# Patient Record
Sex: FEMALE | Race: WHITE | NOT HISPANIC OR LATINO | Employment: OTHER | URBAN - METROPOLITAN AREA
[De-identification: names, ages, dates, MRNs, and addresses within clinical notes are randomized per-mention and may not be internally consistent; named-entity substitution may affect disease eponyms.]

---

## 2017-01-12 RX ORDER — GABAPENTIN 600 MG/1
TABLET ORAL
Qty: 90 TABLET | Refills: 2 | Status: SHIPPED | OUTPATIENT
Start: 2017-01-12 | End: 2017-03-29 | Stop reason: SDUPTHER

## 2017-03-31 RX ORDER — GABAPENTIN 600 MG/1
TABLET ORAL
Qty: 90 TABLET | Refills: 2 | Status: SHIPPED | OUTPATIENT
Start: 2017-03-31 | End: 2017-06-12 | Stop reason: SDUPTHER

## 2017-06-13 RX ORDER — GABAPENTIN 600 MG/1
TABLET ORAL
Qty: 90 TABLET | Refills: 2 | Status: SHIPPED | OUTPATIENT
Start: 2017-06-13 | End: 2017-09-13 | Stop reason: SDUPTHER

## 2017-08-18 DIAGNOSIS — Z12.11 COLON CANCER SCREENING: ICD-10-CM

## 2017-09-14 RX ORDER — GABAPENTIN 600 MG/1
TABLET ORAL
Qty: 90 TABLET | Refills: 2 | Status: SHIPPED | OUTPATIENT
Start: 2017-09-14 | End: 2018-01-12 | Stop reason: SDUPTHER

## 2018-01-14 RX ORDER — GABAPENTIN 600 MG/1
TABLET ORAL
Qty: 90 TABLET | Refills: 2 | Status: SHIPPED | OUTPATIENT
Start: 2018-01-14 | End: 2018-04-07 | Stop reason: SDUPTHER

## 2018-01-17 ENCOUNTER — PATIENT OUTREACH (OUTPATIENT)
Dept: ADMINISTRATIVE | Facility: HOSPITAL | Age: 53
End: 2018-01-17

## 2018-01-17 NOTE — PROGRESS NOTES
Health Maintenance Due   Topic Date Due    TETANUS VACCINE  07/10/1985    Pneumococcal PPSV23 (Medium Risk) (1) 07/10/1985    Mammogram  07/10/2007    Lipid Panel  07/16/2012    Colonoscopy  07/10/2017    Influenza Vaccine  08/01/2017     Due for an annual .  Outreach mailed

## 2018-01-17 NOTE — LETTER
January 17, 2018    Peggy Oliveira  [address] [facility]  [place] [address]             [facility]  [address]  [place] [address]  Phone: [phone] Dear Ms. Oliveira:    [facility] is committed to your overall health.  Our records indicate that you are due for an annual checkup with your primary care provider,  Dr. Johnson.  Please call [phone] to schedule a routine physical exam. You may also be due for the following test and/or procedures:    Tetanus immunization  Pneumonia immunization  Mammogram  Cholesterol check (Lipid Panel)  Colonoscopy (Colorectal screening)  Influenza vaccine    If you have had any of the above done at another facility, please let us know by calling [phone] so that we can update your record.  We will add these results to your chart if you fax them to the fax number listed below.  If you have any questions, please call [phone].       Sincerely,    Marcela Nichols  Clinical Care Coordinator  [facility] [address] [facility] [address]  [place] [address]  Phone: [phone]   Fax: [phone]

## 2018-03-21 PROBLEM — R10.11 RUQ PAIN: Status: ACTIVE | Noted: 2018-03-21

## 2018-03-21 PROBLEM — R74.01 TRANSAMINITIS: Status: ACTIVE | Noted: 2018-03-21

## 2018-03-21 PROBLEM — K83.8 DILATION OF BILIARY TRACT: Status: ACTIVE | Noted: 2018-03-21

## 2018-04-09 RX ORDER — GABAPENTIN 600 MG/1
TABLET ORAL
Qty: 270 TABLET | Refills: 0 | Status: SHIPPED | OUTPATIENT
Start: 2018-04-09 | End: 2018-06-15 | Stop reason: SDUPTHER

## 2018-04-09 NOTE — PROGRESS NOTES
Refill Authorization Note     is requesting a refill authorization.    Brief assessment and rationale for refill: DEFER: needs annual  Amount/Quantity of medication ordered: 90d        Refills Authorized: Yes  If authorized number of refills: 0        Medication-related problems identified: Requires appointment  Medication Therapy Plan: hasn't f/u in some time; defer 3 mo to get pt back in  Name and strength of medication: GABAPENTIN 600 MG TABLET  How patient will take medication: t1t po TID   Medication reconciliation completed: No  Comments:   Lab Results   Component Value Date    CREATININE 1.06 03/21/2018    BUN 28 (H) 03/21/2018     03/21/2018    K 3.8 03/21/2018     03/21/2018    CO2 26 03/21/2018

## 2018-04-17 ENCOUNTER — TELEPHONE (OUTPATIENT)
Dept: ADMINISTRATIVE | Facility: HOSPITAL | Age: 53
End: 2018-04-17

## 2018-04-17 NOTE — TELEPHONE ENCOUNTER
QBP nurse notified via email that patient had an ER visit on 3/21/18 at [facility] for Right upper quadrant pain

## 2018-06-15 ENCOUNTER — OFFICE VISIT (OUTPATIENT)
Dept: FAMILY MEDICINE | Facility: CLINIC | Age: 53
End: 2018-06-15
Payer: COMMERCIAL

## 2018-06-15 VITALS
DIASTOLIC BLOOD PRESSURE: 54 MMHG | SYSTOLIC BLOOD PRESSURE: 90 MMHG | BODY MASS INDEX: 21.17 KG/M2 | HEART RATE: 90 BPM | TEMPERATURE: 98 F | HEIGHT: 63 IN | RESPIRATION RATE: 14 BRPM | OXYGEN SATURATION: 97 % | WEIGHT: 119.5 LBS

## 2018-06-15 DIAGNOSIS — L02.91 ABSCESS: Primary | ICD-10-CM

## 2018-06-15 DIAGNOSIS — G47.00 INSOMNIA, UNSPECIFIED TYPE: ICD-10-CM

## 2018-06-15 DIAGNOSIS — M51.36 DDD (DEGENERATIVE DISC DISEASE), LUMBAR: ICD-10-CM

## 2018-06-15 DIAGNOSIS — M48.061 SPINAL STENOSIS OF LUMBAR REGION, UNSPECIFIED WHETHER NEUROGENIC CLAUDICATION PRESENT: ICD-10-CM

## 2018-06-15 PROCEDURE — 99999 PR PBB SHADOW E&M-EST. PATIENT-LVL IV: CPT | Mod: PBBFAC,,, | Performed by: NURSE PRACTITIONER

## 2018-06-15 PROCEDURE — 99214 OFFICE O/P EST MOD 30 MIN: CPT | Mod: S$GLB,,, | Performed by: NURSE PRACTITIONER

## 2018-06-15 PROCEDURE — 3008F BODY MASS INDEX DOCD: CPT | Mod: CPTII,S$GLB,, | Performed by: NURSE PRACTITIONER

## 2018-06-15 RX ORDER — GABAPENTIN 600 MG/1
TABLET ORAL
Qty: 270 TABLET | Refills: 1 | Status: SHIPPED | OUTPATIENT
Start: 2018-06-15 | End: 2019-01-14 | Stop reason: SDUPTHER

## 2018-06-15 RX ORDER — TRAZODONE HYDROCHLORIDE 50 MG/1
50 TABLET ORAL NIGHTLY PRN
Qty: 30 TABLET | Refills: 1 | Status: SHIPPED | OUTPATIENT
Start: 2018-06-15 | End: 2018-07-01

## 2018-06-15 RX ORDER — IBUPROFEN 800 MG/1
800 TABLET ORAL 2 TIMES DAILY PRN
Qty: 30 TABLET | Refills: 0 | Status: SHIPPED | OUTPATIENT
Start: 2018-06-15 | End: 2018-09-17

## 2018-06-15 RX ORDER — SULFAMETHOXAZOLE AND TRIMETHOPRIM 800; 160 MG/1; MG/1
1 TABLET ORAL 2 TIMES DAILY
Qty: 14 TABLET | Refills: 0 | Status: ON HOLD | OUTPATIENT
Start: 2018-06-15 | End: 2018-06-22
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
## 2018-06-15 NOTE — PATIENT INSTRUCTIONS
Follow up with general surgery if your symptoms worsen or fail to improve        Abscess (Antibiotic Treatment Only)  An abscess (sometimes called a boil) happens when bacteria get trapped under the skin and start to grow. Pus forms inside the abscess as the body responds to the bacteria. An abscess can happen with an insect bite, ingrown hair, blocked oil gland, pimple, cyst, or puncture wound.  In the early stages, your wound may be red and tender. For this stage, you may get antibiotics. If the abscess does not get better with antibiotics, it will need to be drained with a small cut.  Home care  These tips will help you care for your abscess at home:  · Soak the wound in hot water or apply hot packs (small towel soaked in hot water) to the area for 20 minutes at a time. Do this 3 to 4 times a day.  · Do not cut, squeeze, or pop the boil yourself.  · Apply antibiotic cream or ointment to the skin 3 to 4 times a day, unless something else was prescribed. Some ointments include an antibiotic plus a pain reliever.  · If your doctor prescribed antibiotics, do not stop taking them until you have finished the medicine or the doctor tells you to stop.  · You may use an over-the-counter pain medicine to control pain, unless another pain medicine was prescribed. If you have chronic liver or kidney disease or ever had a stomach ulcer or gastrointestinal bleeding, talk with your doctor before using these any of these.  Follow-up care  Follow up with your healthcare provider, or as advised. Check your wound each day for the signs of worsening infection listed below.  When to seek medical advice  Get prompt medical attention if any of these occur:  · An increase in redness or swelling  · Red streaks in the skin leading away from the abscess  · An increase in local pain or swelling  · Fever of 100.4ºF (38ºC) or higher, or as directed by your healthcare provider  · Pus or fluid coming from the abscess  · Boil returns after  getting better  Date Last Reviewed: 9/1/2016  © 1017-4218 [employer]. [address] [place] [address], [place] [address]. All rights reserved. This information is not intended as a substitute for professional medical care. Always follow your healthcare professional's instructions.

## 2018-06-21 PROBLEM — L02.211 ABSCESS OF ABDOMINAL WALL: Status: ACTIVE | Noted: 2018-06-21

## 2018-06-21 PROBLEM — M54.50 CHRONIC LOW BACK PAIN: Status: ACTIVE | Noted: 2018-06-21

## 2018-06-21 PROBLEM — G89.29 CHRONIC NECK PAIN: Status: ACTIVE | Noted: 2018-06-21

## 2018-06-21 PROBLEM — L02.413 ABSCESS OF ARM, RIGHT: Status: ACTIVE | Noted: 2018-06-21

## 2018-06-21 PROBLEM — L02.31 ABSCESS OF GLUTEAL REGION: Status: ACTIVE | Noted: 2018-06-21

## 2018-06-21 PROBLEM — G89.29 CHRONIC LOW BACK PAIN: Status: ACTIVE | Noted: 2018-06-21

## 2018-06-21 PROBLEM — M54.2 CHRONIC NECK PAIN: Status: ACTIVE | Noted: 2018-06-21

## 2018-06-28 ENCOUNTER — OFFICE VISIT (OUTPATIENT)
Dept: FAMILY MEDICINE | Facility: CLINIC | Age: 53
End: 2018-06-28
Payer: COMMERCIAL

## 2018-06-28 VITALS
RESPIRATION RATE: 18 BRPM | HEART RATE: 80 BPM | SYSTOLIC BLOOD PRESSURE: 90 MMHG | OXYGEN SATURATION: 98 % | BODY MASS INDEX: 21.68 KG/M2 | DIASTOLIC BLOOD PRESSURE: 60 MMHG | HEIGHT: 63 IN | WEIGHT: 122.38 LBS

## 2018-06-28 DIAGNOSIS — L02.31 ABSCESS OF GLUTEAL REGION: ICD-10-CM

## 2018-06-28 DIAGNOSIS — F43.10 PTSD (POST-TRAUMATIC STRESS DISORDER): Primary | ICD-10-CM

## 2018-06-28 DIAGNOSIS — L02.211 ABSCESS OF ABDOMINAL WALL: ICD-10-CM

## 2018-06-28 DIAGNOSIS — G47.00 INSOMNIA, UNSPECIFIED TYPE: ICD-10-CM

## 2018-06-28 PROCEDURE — 3008F BODY MASS INDEX DOCD: CPT | Mod: CPTII,S$GLB,, | Performed by: FAMILY MEDICINE

## 2018-06-28 PROCEDURE — 99214 OFFICE O/P EST MOD 30 MIN: CPT | Mod: S$GLB,,, | Performed by: FAMILY MEDICINE

## 2018-06-28 PROCEDURE — 99999 PR PBB SHADOW E&M-EST. PATIENT-LVL III: CPT | Mod: PBBFAC,,, | Performed by: FAMILY MEDICINE

## 2018-06-28 RX ORDER — HYDROCODONE BITARTRATE AND ACETAMINOPHEN 10; 325 MG/1; MG/1
1 TABLET ORAL 3 TIMES DAILY PRN
Qty: 90 TABLET | Refills: 0 | Status: SHIPPED | OUTPATIENT
Start: 2018-06-28 | End: 2018-07-19

## 2018-06-28 RX ORDER — MIRTAZAPINE 30 MG/1
30 TABLET, FILM COATED ORAL NIGHTLY
Qty: 30 TABLET | Refills: 11 | Status: SHIPPED | OUTPATIENT
Start: 2018-06-28 | End: 2019-11-03

## 2018-06-28 NOTE — PROGRESS NOTES
Subjective:       Patient ID: Peggy Oliveira is a 52 y.o. female.    Chief Complaint: Follow-up ([facility]) and Fatigue    HPI     Here for [facility] hospital f/u.     Went last week and was found to multiple abscesses on buttock and lower abdomen. Had I and D surgery.  Seeing wound care.  Reports some fatigue. Reports pain from abscess sites and requesting med for pain control.      Reports insomnia from ptsd approx 1-2 years ago when she found her brother dead in his house.  Reports that trazodone is not helping.       Review of Systems      Review of Systems   Constitutional: Negative for fever and chills.   HENT: Negative for hearing loss and neck stiffness.    Eyes: Negative for redness and itching.   Respiratory: Negative for cough and choking.    Cardiovascular: Negative for chest pain and leg swelling.  Abdomen: Negative for  blood in stool.   Genitourinary: Negative for dysuria and flank pain.   Musculoskeletal: Negative for back pain and gait problem.   Neurological: Negative for light-headedness and headaches.   Hematological: Negative for adenopathy.   Psychiatric/Behavioral: Negative for behavioral problems.       Objective:      Physical Exam   Constitutional: She appears well-developed.   HENT:   Head: Normocephalic and atraumatic.   Eyes: Conjunctivae are normal. Pupils are equal, round, and reactive to light.   Neck: Normal range of motion.   Cardiovascular: Normal rate and regular rhythm.    No murmur heard.  Pulmonary/Chest: Effort normal and breath sounds normal. She has no wheezes.   Abdominal: Soft. Bowel sounds are normal. She exhibits no distension.   3 areas of dressings on lower abdomen   Lymphadenopathy:     She has no cervical adenopathy.       Assessment:       1. PTSD (post-traumatic stress disorder)    2. Insomnia, unspecified type    3. Abscess of abdominal wall    4. Abscess of gluteal region        Plan:       PTSD (post-traumatic stress disorder)    Insomnia, unspecified type    Abscess of  abdominal wall    Abscess of gluteal region    Other orders  -     HYDROcodone-acetaminophen (NORCO)  mg per tablet; Take 1 tablet by mouth 3 (three) times daily as needed.  Dispense: 90 tablet; Refill: 0  -     mirtazapine (REMERON) 30 MG tablet; Take 1 tablet (30 mg total) by mouth every evening.  Dispense: 30 tablet; Refill: 11              Plan:  Start remeron for ptsd and insomnia  rx norco 10 for pain control  Cont all other meds        Medication List with Changes/Refills   New Medications    HYDROCODONE-ACETAMINOPHEN (NORCO)  MG PER TABLET    Take 1 tablet by mouth 3 (three) times daily as needed.    MIRTAZAPINE (REMERON) 30 MG TABLET    Take 1 tablet (30 mg total) by mouth every evening.   Current Medications    GABAPENTIN (NEURONTIN) 600 MG TABLET    TAKE 1 TABLET (600 MG TOTAL) BY MOUTH 3 (THREE) TIMES DAILY.    IBUPROFEN (ADVIL,MOTRIN) 800 MG TABLET    Take 1 tablet (800 mg total) by mouth 2 (two) times daily as needed for Pain.    SULFAMETHOXAZOLE-TRIMETHOPRIM 800-160MG (BACTRIM DS) 800-160 MG TAB    Take 1 tablet by mouth 2 (two) times daily. for 10 days   Discontinued Medications    HYDROCODONE-ACETAMINOPHEN (NORCO) 5-325 MG PER TABLET    Take 1 tablet by mouth every 6 (six) hours as needed for Pain.    TRAZODONE (DESYREL) 50 MG TABLET    Take 1 tablet (50 mg total) by mouth nightly as needed for Insomnia.

## 2018-07-02 DIAGNOSIS — Z12.39 BREAST CANCER SCREENING: ICD-10-CM

## 2018-08-06 NOTE — TELEPHONE ENCOUNTER
----- Message from Yoel Rincon sent at 8/6/2018 12:36 PM CDT -----  Type:  RX Refill Request    Who Called:  Patient  Refill or New Rx:  Refill  RX Name and Strength: Hydrocodone  How is the patient currently taking it? (ex. 1XDay):  3XDay  Is this a 30 day or 90 day RX:  30  Preferred Pharmacy with phone number:    [facility] [address] [facility] [address] [place] [address] & [place] [address]  [address] [place] [id number]  Phone: [phone] Fax: [phone]    Local or Mail Order:  Local  Ordering Provider:  Elizabeth Gibbs Call Back Number:  [phone]  Additional Information:

## 2018-08-07 ENCOUNTER — OFFICE VISIT (OUTPATIENT)
Dept: FAMILY MEDICINE | Facility: CLINIC | Age: 53
End: 2018-08-07
Payer: COMMERCIAL

## 2018-08-07 VITALS
SYSTOLIC BLOOD PRESSURE: 102 MMHG | HEART RATE: 89 BPM | DIASTOLIC BLOOD PRESSURE: 60 MMHG | HEIGHT: 63 IN | WEIGHT: 119.69 LBS | BODY MASS INDEX: 21.21 KG/M2

## 2018-08-07 DIAGNOSIS — Z12.39 BREAST CANCER SCREENING: ICD-10-CM

## 2018-08-07 DIAGNOSIS — E78.5 HYPERLIPIDEMIA, UNSPECIFIED HYPERLIPIDEMIA TYPE: Primary | ICD-10-CM

## 2018-08-07 DIAGNOSIS — F32.A DEPRESSION, UNSPECIFIED DEPRESSION TYPE: ICD-10-CM

## 2018-08-07 DIAGNOSIS — G89.29 CHRONIC LOW BACK PAIN WITHOUT SCIATICA, UNSPECIFIED BACK PAIN LATERALITY: ICD-10-CM

## 2018-08-07 DIAGNOSIS — G47.00 INSOMNIA, UNSPECIFIED TYPE: ICD-10-CM

## 2018-08-07 DIAGNOSIS — M54.50 CHRONIC LOW BACK PAIN WITHOUT SCIATICA, UNSPECIFIED BACK PAIN LATERALITY: ICD-10-CM

## 2018-08-07 PROCEDURE — 99999 PR PBB SHADOW E&M-EST. PATIENT-LVL III: CPT | Mod: PBBFAC,,, | Performed by: FAMILY MEDICINE

## 2018-08-07 PROCEDURE — 3008F BODY MASS INDEX DOCD: CPT | Mod: CPTII,S$GLB,, | Performed by: FAMILY MEDICINE

## 2018-08-07 PROCEDURE — 99214 OFFICE O/P EST MOD 30 MIN: CPT | Mod: S$GLB,,, | Performed by: FAMILY MEDICINE

## 2018-08-07 RX ORDER — HYDROCODONE BITARTRATE AND ACETAMINOPHEN 5; 325 MG/1; MG/1
1 TABLET ORAL EVERY 6 HOURS PRN
Qty: 60 TABLET | Refills: 0 | Status: SHIPPED | OUTPATIENT
Start: 2018-08-07 | End: 2018-09-17
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
## 2018-08-09 RX ORDER — HYDROCODONE BITARTRATE AND ACETAMINOPHEN 10; 325 MG/1; MG/1
1 TABLET ORAL EVERY 8 HOURS PRN
Qty: 90 TABLET | Refills: 0 | OUTPATIENT
Start: 2018-08-09

## 2018-08-24 DIAGNOSIS — Z12.11 COLON CANCER SCREENING: ICD-10-CM

## 2018-09-09 PROBLEM — L02.213 ABSCESS OF CHEST WALL: Status: ACTIVE | Noted: 2018-09-09

## 2018-09-12 NOTE — TELEPHONE ENCOUNTER
----- Message from Carla Greer sent at 9/12/2018  3:27 PM CDT -----  Contact: Patient  Peggy, patient [phone] calling to speak with nurse in office. Patient did not want to go into detail. Please advise. Thanks.

## 2018-09-12 NOTE — TELEPHONE ENCOUNTER
"Phoned pt in regards to message. Pt states she was recently in the ED for a chest abscess and was sent home with RX oxycodone. Pt states she is unable to take the RX oxycodone as it is making her "throw up". Pt is requesting Dr Johnson to sned her in a RX for the norco. Instructed pt that Dr Johnson is out of the office this afternoon and that the request can take up to 72 hours to be processed. Pt has a f/u appt 9/17/18 with Dr Johnson. Please review and advise on pended medication. Thank you. CLC  "

## 2018-09-13 ENCOUNTER — TELEPHONE (OUTPATIENT)
Dept: FAMILY MEDICINE | Facility: CLINIC | Age: 53
End: 2018-09-13

## 2018-09-13 RX ORDER — HYDROCODONE BITARTRATE AND ACETAMINOPHEN 5; 325 MG/1; MG/1
1 TABLET ORAL EVERY 6 HOURS PRN
Qty: 60 TABLET | Refills: 0 | OUTPATIENT
Start: 2018-09-13

## 2018-09-13 NOTE — TELEPHONE ENCOUNTER
Pt states oxycodone makes her mean and vomit.  She is asking if you will refill the hydrocodone for her as she is in significant pain due to having a drainage tube for the abscess in the right axilla.  Please advise if this can be done and if not, what should she do for pain?  Please advise.

## 2018-09-13 NOTE — TELEPHONE ENCOUNTER
----- Message from Sandee Monae sent at 9/13/2018  1:39 PM CDT -----  Contact: pt  ..Type:  Patient Returning Call    Who Called:  pt  Who Left Message for Patient:  Ramiro  Does the patient know what this is regarding?:  Had called for the nurse Ramiro and she was returning her call.  Best Call Back Number:  [phone](best number)

## 2018-09-13 NOTE — TELEPHONE ENCOUNTER
----- Message from Kelley Castelan sent at 9/13/2018 10:00 AM CDT -----  Contact: Pt  Name of Who is Calling: Peggy      What is the request in detail: Patient is calling requesting to speak with a nurse       Can the clinic reply by [facility]: no      [facility]: [phone] (home)

## 2018-09-13 NOTE — TELEPHONE ENCOUNTER
----- Message from Alton HALL Frisard sent at 9/13/2018  1:29 PM CDT -----  Contact: same  Patient called in and stated she just needs the nurse to call her back about her appt on Monday 9/17/18 & would not give any other information.  Call back number is [phone]

## 2018-09-17 ENCOUNTER — OFFICE VISIT (OUTPATIENT)
Dept: FAMILY MEDICINE | Facility: CLINIC | Age: 53
End: 2018-09-17
Payer: COMMERCIAL

## 2018-09-17 VITALS
SYSTOLIC BLOOD PRESSURE: 130 MMHG | HEIGHT: 63 IN | HEART RATE: 94 BPM | DIASTOLIC BLOOD PRESSURE: 84 MMHG | RESPIRATION RATE: 16 BRPM | WEIGHT: 119.69 LBS | BODY MASS INDEX: 21.21 KG/M2 | OXYGEN SATURATION: 98 %

## 2018-09-17 DIAGNOSIS — L02.411 ABSCESS OF AXILLA, RIGHT: Primary | ICD-10-CM

## 2018-09-17 PROCEDURE — 99999 PR PBB SHADOW E&M-EST. PATIENT-LVL III: CPT | Mod: PBBFAC,,, | Performed by: FAMILY MEDICINE

## 2018-09-17 PROCEDURE — 99214 OFFICE O/P EST MOD 30 MIN: CPT | Mod: S$GLB,,, | Performed by: FAMILY MEDICINE

## 2018-09-17 PROCEDURE — 3008F BODY MASS INDEX DOCD: CPT | Mod: CPTII,S$GLB,, | Performed by: FAMILY MEDICINE

## 2018-09-17 NOTE — PROGRESS NOTES
Subjective:       Patient ID: Peggy Oliveira is a 53 y.o. female.    Chief Complaint: Abscess    HPI     Here for [facility] hospitalization.  Active drug abuser.     Was hospitalized from 9/8/18 to 9/10/18 for right axillary abscess s/p I and D.      Reports less drainage and swelling but still some hardening and pain from I and D.      Due to pain, pt requesting narcotics for pain control.       Review of Systems      Review of Systems   Constitutional: Negative for fever and chills.   HENT: Negative for hearing loss and neck stiffness.    Eyes: Negative for redness and itching.   Respiratory: Negative for cough and choking.    Cardiovascular: Negative for chest pain and leg swelling.  Abdomen: Negative for abdominal pain and blood in stool.   Genitourinary: Negative for dysuria and flank pain.   Musculoskeletal: Negative for back pain and gait problem.   Neurological: Negative for light-headedness and headaches.   Hematological: Negative for adenopathy.   Psychiatric/Behavioral: Negative for behavioral problems.     Objective:      Physical Exam   Constitutional: She appears well-developed.   HENT:   Head: Normocephalic and atraumatic.   Eyes: Conjunctivae are normal. Pupils are equal, round, and reactive to light.   Neck: Normal range of motion.   Cardiovascular: Normal rate and regular rhythm.   No murmur heard.  Pulmonary/Chest: Effort normal and breath sounds normal.   Lymphadenopathy:     She has no cervical adenopathy.   Skin:   Nurse present:    Right axilla: packing removed from 2 incision sites along right axilla. Mild swelling and hardening. No redness noted.  Tender.        Assessment:       1. Abscess of axilla, right        Plan:       Abscess of axilla, right  -     Ambulatory referral to General Surgery          Plan:  Some residual hardening noted from I and D of abscess. Refer to gen surgery  Informed pt that I will not fill any narcotics for pain control. Recommended otc nsaids.

## 2018-09-18 PROBLEM — M54.41 CHRONIC BILATERAL LOW BACK PAIN WITH BILATERAL SCIATICA: Status: ACTIVE | Noted: 2018-06-21

## 2018-09-18 PROBLEM — M54.42 CHRONIC BILATERAL LOW BACK PAIN WITH BILATERAL SCIATICA: Status: ACTIVE | Noted: 2018-06-21
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
## 2018-09-19 ENCOUNTER — OFFICE VISIT (OUTPATIENT)
Dept: PAIN MEDICINE | Facility: CLINIC | Age: 53
End: 2018-09-19
Payer: COMMERCIAL

## 2018-09-19 ENCOUNTER — OFFICE VISIT (OUTPATIENT)
Dept: SURGERY | Facility: CLINIC | Age: 53
End: 2018-09-19
Payer: COMMERCIAL

## 2018-09-19 VITALS
BODY MASS INDEX: 21.42 KG/M2 | SYSTOLIC BLOOD PRESSURE: 123 MMHG | WEIGHT: 120.88 LBS | DIASTOLIC BLOOD PRESSURE: 77 MMHG | TEMPERATURE: 100 F | HEART RATE: 96 BPM | HEIGHT: 63 IN

## 2018-09-19 VITALS
RESPIRATION RATE: 20 BRPM | SYSTOLIC BLOOD PRESSURE: 114 MMHG | HEIGHT: 63 IN | TEMPERATURE: 97 F | HEART RATE: 96 BPM | OXYGEN SATURATION: 99 % | DIASTOLIC BLOOD PRESSURE: 62 MMHG | WEIGHT: 117.81 LBS | BODY MASS INDEX: 20.88 KG/M2

## 2018-09-19 DIAGNOSIS — M51.36 DDD (DEGENERATIVE DISC DISEASE), LUMBAR: ICD-10-CM

## 2018-09-19 DIAGNOSIS — M54.42 CHRONIC BILATERAL LOW BACK PAIN WITH BILATERAL SCIATICA: Primary | ICD-10-CM

## 2018-09-19 DIAGNOSIS — F14.10 COCAINE ABUSE: ICD-10-CM

## 2018-09-19 DIAGNOSIS — M48.061 SPINAL STENOSIS OF LUMBAR REGION, UNSPECIFIED WHETHER NEUROGENIC CLAUDICATION PRESENT: ICD-10-CM

## 2018-09-19 DIAGNOSIS — Z86.14 HISTORY OF MRSA INFECTION: Primary | ICD-10-CM

## 2018-09-19 DIAGNOSIS — M54.41 CHRONIC BILATERAL LOW BACK PAIN WITH BILATERAL SCIATICA: Primary | ICD-10-CM

## 2018-09-19 DIAGNOSIS — S32.010S CLOSED COMPRESSION FRACTURE OF FIRST LUMBAR VERTEBRA, SEQUELA: ICD-10-CM

## 2018-09-19 DIAGNOSIS — G89.29 CHRONIC BILATERAL LOW BACK PAIN WITH BILATERAL SCIATICA: Primary | ICD-10-CM

## 2018-09-19 PROCEDURE — 99244 OFF/OP CNSLTJ NEW/EST MOD 40: CPT | Mod: S$GLB,,, | Performed by: PAIN MEDICINE

## 2018-09-19 PROCEDURE — 99999 PR PBB SHADOW E&M-EST. PATIENT-LVL III: CPT | Mod: PBBFAC,,, | Performed by: PAIN MEDICINE

## 2018-09-19 PROCEDURE — 99999 PR PBB SHADOW E&M-EST. PATIENT-LVL III: CPT | Mod: PBBFAC,,, | Performed by: SURGERY

## 2018-09-19 PROCEDURE — 99243 OFF/OP CNSLTJ NEW/EST LOW 30: CPT | Mod: S$GLB,,, | Performed by: SURGERY

## 2018-09-19 NOTE — LETTER
September 19, 2018      Ricardo Johnson MD  [address] [facility] [address]  [facility] [address]           [facility]  [address] [facility] [address] [place] [address]  Phone: [phone]          Patient: Peggy Olvieira   MR Number: [id number]   YOB: 1965   Date of Visit: 9/19/2018       Dear Dr. Ricardo Johnson:    Thank you for referring Peggy Oliveira to me for evaluation. Attached you will find relevant portions of my assessment and plan of care.    If you have questions, please do not hesitate to call me. I look forward to following Peggy Oliveira along with you.    Sincerely,    Ubaldo Myers MD    Enclosure  CC:  No Recipients    If you would like to receive this communication electronically, please contact [email or web address] or [phone] to request more information on [employer] Link access.    For providers and/or their staff who would like to refer a patient to [facility], please contact us through our one-stop-shop provider referral line, [facility] , at [phone].    If you feel you have received this communication in error or would no longer like to receive these types of communications, please e-mail [email or web address]

## 2018-09-19 NOTE — LETTER
September 19, 2018      Ricardo Johnson MD  [address] [facility] [address] [place] [address]           [facility]  [address] [facility] [address] [place] [address]  Phone: [phone]  Fax: [phone]          Patient: Peggy Oliveira   MR Number: [id number]   YOB: 1965   Date of Visit: 9/19/2018       Dear Dr. Rciardo Johnson:    Thank you for referring Peggy Oliveira to me for evaluation. Attached you will find relevant portions of my assessment and plan of care.    If you have questions, please do not hesitate to call me. I look forward to following Peggy Oliveira along with you.    Sincerely,    Leonel Lanza Jr., MD    Enclosure  CC:  No Recipients    If you would like to receive this communication electronically, please contact [email or web address] or [phone] to request more information on [employer] Link access.    For providers and/or their staff who would like to refer a patient to [facility], please contact us through our one-stop-shop provider referral line, [facility], at [phone].    If you feel you have received this communication in error or would no longer like to receive these types of communications, please e-mail [email or web address]

## 2018-09-19 NOTE — Clinical Note
I saw your patient, Peggy Oliveira in the office.  Attached are my findings and plan.  Thank you for referring her to my office and if you have any questions please do not hesitate to call my cell [phone].Hero Myers

## 2018-09-19 NOTE — PROGRESS NOTES
Subjective:       Patient ID: Peggy Oliveira is a 53 y.o. female.    Chief Complaint: Consult (Abscess rt axilla)    HPI  52 yo F referred to me in consultation from Dr Johnson> Pt recently had I&D of abscess in her R axilla at outside facility a few weeks ago.   Dr Johnson removed packing and referred to me. Pt notes some tenderness persists in the area of the R axilla. No drainage. No fevers.  Pt notes multiple abscess over the course of the year.  Review of Systems   Constitutional: Positive for fever. Negative for activity change, appetite change and unexpected weight change.   HENT: Negative for congestion and ear pain.    Respiratory: Negative for chest tightness, shortness of breath and wheezing.    Cardiovascular: Negative for chest pain.   Genitourinary: Negative for difficulty urinating, dysuria and frequency.   Skin: Positive for wound. Negative for color change.   Neurological: Negative for dizziness.   Hematological: Negative for adenopathy. Does not bruise/bleed easily.   Psychiatric/Behavioral: Negative for agitation and decreased concentration.       Objective:      Physical Exam   Constitutional: She is oriented to person, place, and time. She appears well-developed and well-nourished.   HENT:   Head: Normocephalic and atraumatic.   Eyes: Pupils are equal, round, and reactive to light.   Neck: Normal range of motion. Neck supple. No tracheal deviation present. No thyromegaly present.   Cardiovascular: Normal rate, regular rhythm and normal heart sounds.   No murmur heard.  Pulmonary/Chest: Effort normal and breath sounds normal. She exhibits no tenderness.   Abdominal: Soft. Bowel sounds are normal. She exhibits no distension, no abdominal bruit and no pulsatile midline mass. There is no hepatosplenomegaly. There is no rigidity, no tenderness at McBurney's point and negative Abrbosa's sign. Hernia confirmed negative in the ventral area.   Genitourinary: Rectum normal.   Musculoskeletal: Normal range  of motion.   Neurological: She is alert and oriented to person, place, and time.   Skin: Skin is warm. No rash noted. No erythema.        Psychiatric: She has a normal mood and affect. Her behavior is normal.   Vitals reviewed.      Assessment:       1. History of MRSA infection        Plan:       NO signs ofcontinued abscess.  Certainly no obvious drainable fluid collection.  Given pain will obtain an US of the area to R/O undrained fluid collection

## 2018-10-26 DIAGNOSIS — M48.061 SPINAL STENOSIS OF LUMBAR REGION, UNSPECIFIED WHETHER NEUROGENIC CLAUDICATION PRESENT: ICD-10-CM

## 2018-10-26 DIAGNOSIS — M51.36 DDD (DEGENERATIVE DISC DISEASE), LUMBAR: ICD-10-CM

## 2018-10-26 RX ORDER — GABAPENTIN 600 MG/1
TABLET ORAL
Qty: 270 TABLET | Refills: 0 | OUTPATIENT
Start: 2018-10-26

## 2018-11-05 ENCOUNTER — PATIENT OUTREACH (OUTPATIENT)
Dept: ADMINISTRATIVE | Facility: HOSPITAL | Age: 53
End: 2018-11-05

## 2018-11-13 ENCOUNTER — PATIENT OUTREACH (OUTPATIENT)
Dept: ADMINISTRATIVE | Facility: HOSPITAL | Age: 53
End: 2018-11-13

## 2018-11-13 NOTE — PROGRESS NOTES
Health Maintenance Due   Topic Date Due    TETANUS VACCINE  07/10/1985    Pneumococcal PPSV23 (Medium Risk) (1) 07/10/1985    Mammogram  07/10/2007    Lipid Panel  07/16/2012    Colonoscopy  07/10/2017    Influenza Vaccine  08/01/2018     Portal outreach un-read by patient.  Outreach mailed today

## 2018-11-13 NOTE — LETTER
November 13, 2018    Peggy Oliveira  [address]  [place] [address]             [facility]  [address]  [facility] [address]  Phone: [phone] Dear Ms. Oliveira:    We have tried to reach you by [facility] email unsuccessfully.      [facility] is committed to your overall health.  To help you get the most out of each of your visits, we will review your information to make sure you are up to date on all of your recommended tests and/or procedures.       Dr. Johnson  has found that your chart shows you may be due for the following:     Tetanus Immunization   Pneumonia Immunization   Mammogram   Fasting cholesterol labs (Lipid Panel)   Colon cancer screening   Influenza Vaccine     If you have had any of the above done at another facility, please bring the records or information with you so that your record at [facility] will be complete.  If you would like to schedule any of these, please contact me.      If you are currently taking medication , please bring it with you to your appointment for review.     Sincerely,    Marcela Nichols  Clinical Care Coordinator  [facility] [facility] [address] [facility]  [place] [address]  Phone: [phone]   Fax: [phone]

## 2019-01-14 DIAGNOSIS — M48.061 SPINAL STENOSIS OF LUMBAR REGION, UNSPECIFIED WHETHER NEUROGENIC CLAUDICATION PRESENT: ICD-10-CM

## 2019-01-14 DIAGNOSIS — M51.36 DDD (DEGENERATIVE DISC DISEASE), LUMBAR: ICD-10-CM

## 2019-01-16 RX ORDER — GABAPENTIN 600 MG/1
TABLET ORAL
Qty: 270 TABLET | Refills: 1 | Status: SHIPPED | OUTPATIENT
Start: 2019-01-16 | End: 2019-05-16 | Stop reason: SDUPTHER

## 2019-05-16 DIAGNOSIS — M48.061 SPINAL STENOSIS OF LUMBAR REGION, UNSPECIFIED WHETHER NEUROGENIC CLAUDICATION PRESENT: ICD-10-CM

## 2019-05-16 DIAGNOSIS — M51.36 DDD (DEGENERATIVE DISC DISEASE), LUMBAR: ICD-10-CM

## 2019-05-17 RX ORDER — GABAPENTIN 600 MG/1
TABLET ORAL
Qty: 270 TABLET | Refills: 0 | Status: SHIPPED | OUTPATIENT
Start: 2019-05-17 | End: 2019-05-27 | Stop reason: SDUPTHER

## 2019-05-27 DIAGNOSIS — M48.061 SPINAL STENOSIS OF LUMBAR REGION, UNSPECIFIED WHETHER NEUROGENIC CLAUDICATION PRESENT: ICD-10-CM

## 2019-05-27 DIAGNOSIS — M51.36 DDD (DEGENERATIVE DISC DISEASE), LUMBAR: ICD-10-CM

## 2019-05-27 NOTE — TELEPHONE ENCOUNTER
----- Message from Tracy Maldonado sent at 5/27/2019  1:29 PM CDT -----  Contact: Peggy pt  Type:  RX Refill Request    Who Called:  Peggy  Refill or New Rx:  refill  RX Name and Strength:  gabapentin (NEURONTIN) 600 MG tablet  How is the patient currently taking it? (ex. 1XDay):  As directed  Is this a 30 day or 90 day RX:  30  Preferred Pharmacy with phone number:    [facility] [address] [facility] [id number] [place] [address] [address] [place] [address]  [address] [place] [address]  Phone: [phone] Fax: [phone]      Local or Mail Order:  local  Ordering Provider:  Elizabeth Gibbs Call Back Number:  [phone]  Additional Information:  Pls call pt regarding rx. She wants it in a 30 days supply instead of a 90. Also has other questions regarding rx.

## 2019-05-27 NOTE — TELEPHONE ENCOUNTER
lov 8/7/18    Patient is requesting a 30 day supply which is 90 pills if taking it tid. Patient states she cant afford the 90 day supply. Please advise

## 2019-05-28 DIAGNOSIS — M48.061 SPINAL STENOSIS OF LUMBAR REGION, UNSPECIFIED WHETHER NEUROGENIC CLAUDICATION PRESENT: ICD-10-CM

## 2019-05-28 DIAGNOSIS — M51.36 DDD (DEGENERATIVE DISC DISEASE), LUMBAR: ICD-10-CM

## 2019-05-28 RX ORDER — GABAPENTIN 600 MG/1
TABLET ORAL
Qty: 270 TABLET | Refills: 0 | Status: SHIPPED | OUTPATIENT
Start: 2019-05-28 | End: 2019-07-09

## 2019-05-28 RX ORDER — GABAPENTIN 600 MG/1
TABLET ORAL
Qty: 90 TABLET | Refills: 0 | Status: SHIPPED | OUTPATIENT
Start: 2019-05-28 | End: 2019-11-03

## 2019-07-09 ENCOUNTER — NURSE TRIAGE (OUTPATIENT)
Dept: ADMINISTRATIVE | Facility: CLINIC | Age: 54
End: 2019-07-09

## 2019-07-09 NOTE — TELEPHONE ENCOUNTER
Hx of pancrease problems, abdominal and back pain. Declines triage,  Wants to see the md today, declined available appt today with NP, states they will go to the ED.    Reason for Disposition   Caller requesting an appointment, triage offered and declined    Protocols used: PCP CALL - NO TRIAGE-A-

## 2019-09-06 DIAGNOSIS — M51.36 DDD (DEGENERATIVE DISC DISEASE), LUMBAR: ICD-10-CM

## 2019-09-06 DIAGNOSIS — M48.061 SPINAL STENOSIS OF LUMBAR REGION, UNSPECIFIED WHETHER NEUROGENIC CLAUDICATION PRESENT: ICD-10-CM

## 2019-09-06 NOTE — PROGRESS NOTES
Refill Authorization Note     is requesting a refill authorization.    Brief assessment and rationale for refill: ROUTE: OP/Due for annual/Recently went to ED  Name and strength of medication: GABAPENTIN 600MG TABLETS  Medication-related problems identified: Requires appointment    Medication Therapy Plan: PCP needs to be corrected to Dr. Johnson    Medication reconciliation completed: No              How patient will take medication: t1t tid          Comments: APPOINTMENTS (past 12m or future 3m authorizing provider)   Date Provider   LAST VISIT  9/17/2018 Ricardo Johnson MD   NEXT VISIT  Visit date not found Ricardo Johnson MD

## 2019-09-08 RX ORDER — GABAPENTIN 600 MG/1
TABLET ORAL
Qty: 90 TABLET | Refills: 0 | OUTPATIENT
Start: 2019-09-08

## 2019-10-19 ENCOUNTER — TELEPHONE (OUTPATIENT)
Dept: FAMILY MEDICINE | Facility: CLINIC | Age: 54
End: 2019-10-19

## 2019-11-03 PROBLEM — N17.9 AKI (ACUTE KIDNEY INJURY): Status: ACTIVE | Noted: 2019-11-03

## 2019-11-03 PROBLEM — R41.82 ALTERED MENTAL STATE: Status: ACTIVE | Noted: 2019-11-03

## 2019-11-03 PROBLEM — K85.90 ACUTE PANCREATITIS: Status: ACTIVE | Noted: 2019-11-03

## 2019-11-04 PROBLEM — E83.52 HYPERCALCEMIA: Status: ACTIVE | Noted: 2019-11-04

## 2019-11-04 PROBLEM — E86.1 INTRAVASCULAR VOLUME DEPLETION: Status: ACTIVE | Noted: 2019-11-04

## 2019-11-12 DIAGNOSIS — M48.061 SPINAL STENOSIS OF LUMBAR REGION, UNSPECIFIED WHETHER NEUROGENIC CLAUDICATION PRESENT: ICD-10-CM

## 2019-11-12 DIAGNOSIS — M51.36 DDD (DEGENERATIVE DISC DISEASE), LUMBAR: ICD-10-CM

## 2019-11-12 NOTE — PROGRESS NOTES
Refill Routing Note     Medication(s) are appropriate for refill:    Medication Outside of Protocol    Appointments past 12m or future 3m    Date Provider   Last Visit   9/17/2018 Ricardo Johnson MD   Next Visit   Visit date not found Ricardo Johnson MD     [facility] Protocol Data:      Requested Prescriptions   Pending Prescriptions Disp Refills    gabapentin (NEURONTIN) 600 MG tablet [Pharmacy Med Name: GABAPENTIN 600MG TABLETS] 270 tablet 0     Sig: TAKE 1 TABLET(600 MG) BY MOUTH THREE TIMES DAILY       Anticonvulsants Protocol Failed - 11/12/2019  8:58 AM        Failed - Active on medication list        Failed - Visit with Authorizing provider in past 9 months or upcoming 90 days        Passed - No active pregnancy on record

## 2019-11-13 RX ORDER — GABAPENTIN 600 MG/1
TABLET ORAL
Qty: 270 TABLET | Refills: 0 | Status: SHIPPED | OUTPATIENT
Start: 2019-11-13

## 2021-05-06 ENCOUNTER — PATIENT MESSAGE (OUTPATIENT)
Dept: RESEARCH | Facility: HOSPITAL | Age: 56
End: 2021-05-06

## 2022-03-11 ENCOUNTER — TELEPHONE (OUTPATIENT)
Dept: FAMILY MEDICINE | Facility: CLINIC | Age: 57
End: 2022-03-11
Payer: COMMERCIAL